# Patient Record
Sex: MALE | Race: WHITE | Employment: OTHER | URBAN - METROPOLITAN AREA
[De-identification: names, ages, dates, MRNs, and addresses within clinical notes are randomized per-mention and may not be internally consistent; named-entity substitution may affect disease eponyms.]

---

## 2017-02-15 PROCEDURE — 84153 ASSAY OF PSA TOTAL: CPT | Performed by: INTERNAL MEDICINE

## 2017-02-15 PROCEDURE — 36415 COLL VENOUS BLD VENIPUNCTURE: CPT | Performed by: INTERNAL MEDICINE

## 2017-02-15 PROCEDURE — 84154 ASSAY OF PSA FREE: CPT | Performed by: INTERNAL MEDICINE

## 2017-08-02 PROBLEM — E78.5 DYSLIPIDEMIA: Status: ACTIVE | Noted: 2017-08-02

## 2017-08-17 ENCOUNTER — HOSPITAL ENCOUNTER (OUTPATIENT)
Dept: INTERVENTIONAL RADIOLOGY/VASCULAR | Facility: HOSPITAL | Age: 67
Discharge: HOME | End: 2017-08-17
Attending: INTERNAL MEDICINE | Admitting: INTERNAL MEDICINE
Payer: MEDICARE

## 2017-08-17 VITALS
HEIGHT: 72 IN | OXYGEN SATURATION: 98 % | WEIGHT: 196 LBS | HEART RATE: 64 BPM | TEMPERATURE: 98 F | BODY MASS INDEX: 26.55 KG/M2 | RESPIRATION RATE: 16 BRPM | SYSTOLIC BLOOD PRESSURE: 116 MMHG | DIASTOLIC BLOOD PRESSURE: 60 MMHG

## 2017-08-17 DIAGNOSIS — R94.39 ABNORMAL STRESS ECG: ICD-10-CM

## 2017-08-17 DIAGNOSIS — I20.8 ANGINA EFFORT: ICD-10-CM

## 2017-08-17 LAB — ISTAT ACTIVATED CLOTTING TIME: 522 SECONDS (ref 74–137)

## 2017-08-17 PROCEDURE — 85347 COAGULATION TIME ACTIVATED: CPT

## 2017-08-17 PROCEDURE — 4A033BC MEASUREMENT OF ARTERIAL PRESSURE, CORONARY, PERCUTANEOUS APPROACH: ICD-10-PCS | Performed by: INTERNAL MEDICINE

## 2017-08-17 PROCEDURE — 93571 IV DOP VEL&/PRESS C FLO 1ST: CPT

## 2017-08-17 PROCEDURE — B2151ZZ FLUOROSCOPY OF LEFT HEART USING LOW OSMOLAR CONTRAST: ICD-10-PCS | Performed by: INTERNAL MEDICINE

## 2017-08-17 PROCEDURE — 99153 MOD SED SAME PHYS/QHP EA: CPT

## 2017-08-17 PROCEDURE — 99152 MOD SED SAME PHYS/QHP 5/>YRS: CPT

## 2017-08-17 PROCEDURE — 4A023N7 MEASUREMENT OF CARDIAC SAMPLING AND PRESSURE, LEFT HEART, PERCUTANEOUS APPROACH: ICD-10-PCS | Performed by: INTERNAL MEDICINE

## 2017-08-17 PROCEDURE — 93458 L HRT ARTERY/VENTRICLE ANGIO: CPT

## 2017-08-17 PROCEDURE — B2111ZZ FLUOROSCOPY OF MULTIPLE CORONARY ARTERIES USING LOW OSMOLAR CONTRAST: ICD-10-PCS | Performed by: INTERNAL MEDICINE

## 2017-08-17 RX ORDER — ASPIRIN 81 MG/1
324 TABLET, CHEWABLE ORAL ONCE
Status: DISCONTINUED | OUTPATIENT
Start: 2017-08-17 | End: 2017-08-17

## 2017-08-17 RX ORDER — SODIUM CHLORIDE 9 MG/ML
INJECTION, SOLUTION INTRAVENOUS CONTINUOUS
Status: DISCONTINUED | OUTPATIENT
Start: 2017-08-17 | End: 2017-08-17

## 2017-08-17 RX ORDER — HEPARIN SODIUM 5000 [USP'U]/ML
INJECTION, SOLUTION INTRAVENOUS; SUBCUTANEOUS
Status: DISCONTINUED
Start: 2017-08-17 | End: 2017-08-17 | Stop reason: WASHOUT

## 2017-08-17 RX ORDER — LIDOCAINE HYDROCHLORIDE 10 MG/ML
INJECTION, SOLUTION INFILTRATION; PERINEURAL
Status: COMPLETED
Start: 2017-08-17 | End: 2017-08-17

## 2017-08-17 RX ORDER — MIDAZOLAM HYDROCHLORIDE 1 MG/ML
INJECTION INTRAMUSCULAR; INTRAVENOUS
Status: COMPLETED
Start: 2017-08-17 | End: 2017-08-17

## 2017-08-17 RX ORDER — ADENOSINE 3 MG/ML
INJECTION, SOLUTION INTRAVENOUS
Status: COMPLETED
Start: 2017-08-17 | End: 2017-08-17

## 2017-08-17 RX ORDER — HEPARIN SODIUM 5000 [USP'U]/ML
INJECTION, SOLUTION INTRAVENOUS; SUBCUTANEOUS
Status: COMPLETED
Start: 2017-08-17 | End: 2017-08-17

## 2017-08-17 RX ORDER — VERAPAMIL HYDROCHLORIDE 2.5 MG/ML
INJECTION, SOLUTION INTRAVENOUS
Status: COMPLETED
Start: 2017-08-17 | End: 2017-08-17

## 2017-08-17 RX ADMIN — SODIUM CHLORIDE: 9 INJECTION, SOLUTION INTRAVENOUS at 07:30:00

## 2017-08-17 NOTE — PROCEDURES
[facility]      Procedure:  LHC, CORONARY ANGIO, LV ANGIO, FFR- LAD                         Findings    LVEF: 60-65%, no mitral regurgitation. LVEDP 7mmHg; no aortic stenosis.     LM: angiographically normal    LCx: Angiographically normal     LAD: 5

## 2017-08-17 NOTE — PROGRESS NOTES
Rc'd pt from cath lab in stable condition. VSS. TR band to right wrist is intact. Site soft, clean and dry, no bleeding or hematoma. Pt denies c/o pain, minimal numbness. Color normal, strong radial pulse and good pleth.  After 1hr, air was removed from ban

## 2017-08-17 NOTE — PROCEDURES
[facility]    PATIENT'S NAME: BOB GREGORY   ATTENDING PHYSICIAN: Karthik Novoa M.D.    OPERATING PHYSICIAN: Dominik Costello MD   PATIENT ACCOUNT#:   [id number]    LOCATION:  [facility] 2 EDWP 10  MEDICAL RECORD #:   [id number]       DATE OF BIRTH:  [birth year] the conclusion of the study, the right radial artery hemostasis was achieved using a radial hemostasis band. LEFT HEART CATHETERIZATION:  The left ventricular end-diastolic pressure was 7 mmHg. There was no aortic stenosis.   Left ventriculogram demon artery and positioned distal to the mid LAD lesion. The baseline FFR ratio was 0.95; after 2 minutes of systemic adenosine (140 mcg/kg/min), repeat FFR ratio was 0.85 with no significant pressure drift upon pullback.   Post FFR angiography demonstrated no

## 2017-08-18 NOTE — PROGRESS NOTES
9/8/2017   3:00 PM    MD KASIE Rankin  3/5/2018   2:00 PM    Neil Granados MD    [facility]    [place] [facility]  3/21/2018  8:40 AM    Doe Flight             [address]     [address]  3/21/2018  9:40 AM    [place]

## 2017-09-12 NOTE — H&P
Reviewed history and physical dated 8/4/17, and note no change in pt history, physical exam, assessment and plan. VS reviewed and stable.   Gen: no apparent distress  Cv: normal rate and regular rhythm, nl s1s2, no mrg  Pulm: ctab, no w/c  Abd: soft, n

## 2017-12-13 PROCEDURE — 84630 ASSAY OF ZINC: CPT | Performed by: OTOLARYNGOLOGY

## 2017-12-13 PROCEDURE — 36415 COLL VENOUS BLD VENIPUNCTURE: CPT | Performed by: OTOLARYNGOLOGY

## 2018-02-13 PROBLEM — K14.0 GLOSSITIS: Status: ACTIVE | Noted: 2018-02-13

## 2018-03-09 PROCEDURE — 84630 ASSAY OF ZINC: CPT | Performed by: PHYSICIAN ASSISTANT

## 2018-03-09 PROCEDURE — 84403 ASSAY OF TOTAL TESTOSTERONE: CPT | Performed by: UROLOGY

## 2018-03-21 PROBLEM — J45.20 MILD INTERMITTENT ASTHMA WITHOUT COMPLICATION: Status: ACTIVE | Noted: 2018-03-21

## 2018-04-13 PROCEDURE — 86235 NUCLEAR ANTIGEN ANTIBODY: CPT | Performed by: OTOLARYNGOLOGY

## 2018-06-27 PROBLEM — E55.9 VITAMIN D DEFICIENCY: Status: ACTIVE | Noted: 2018-06-27

## 2018-06-27 PROBLEM — G47.00 INSOMNIA, UNSPECIFIED TYPE: Status: ACTIVE | Noted: 2018-06-27

## 2018-06-27 PROBLEM — E78.5 DYSLIPIDEMIA: Status: RESOLVED | Noted: 2017-08-02 | Resolved: 2018-06-27

## 2018-06-28 PROCEDURE — 84630 ASSAY OF ZINC: CPT | Performed by: OTOLARYNGOLOGY

## 2018-06-28 PROCEDURE — 82607 VITAMIN B-12: CPT | Performed by: INTERNAL MEDICINE

## 2019-03-15 PROCEDURE — 86255 FLUORESCENT ANTIBODY SCREEN: CPT | Performed by: INTERNAL MEDICINE

## 2019-03-15 PROCEDURE — 86200 CCP ANTIBODY: CPT | Performed by: INTERNAL MEDICINE

## 2019-07-05 PROBLEM — K14.0 GLOSSITIS: Status: RESOLVED | Noted: 2018-02-13 | Resolved: 2019-07-05

## 2019-07-23 PROBLEM — R20.0 NUMBNESS AND TINGLING IN RIGHT HAND: Status: ACTIVE | Noted: 2019-07-23

## 2019-07-23 PROBLEM — R20.2 NUMBNESS AND TINGLING IN RIGHT HAND: Status: ACTIVE | Noted: 2019-07-23

## 2019-07-23 PROCEDURE — 86256 FLUORESCENT ANTIBODY TITER: CPT | Performed by: INTERNAL MEDICINE

## 2019-07-23 PROCEDURE — 82784 ASSAY IGA/IGD/IGG/IGM EACH: CPT | Performed by: INTERNAL MEDICINE

## 2019-08-13 PROBLEM — M51.36 LUMBAR DEGENERATIVE DISC DISEASE: Status: ACTIVE | Noted: 2019-08-13

## 2019-08-13 PROBLEM — M17.12 PRIMARY OSTEOARTHRITIS OF LEFT KNEE: Status: ACTIVE | Noted: 2019-08-13

## 2019-09-03 PROCEDURE — 88305 TISSUE EXAM BY PATHOLOGIST: CPT | Performed by: INTERNAL MEDICINE

## 2020-07-22 PROBLEM — M17.11 PRIMARY OSTEOARTHRITIS OF RIGHT KNEE: Status: ACTIVE | Noted: 2020-07-22

## 2021-05-12 PROBLEM — R20.2 NUMBNESS AND TINGLING IN RIGHT HAND: Status: RESOLVED | Noted: 2019-07-23 | Resolved: 2021-05-12

## 2021-05-12 PROBLEM — R20.0 NUMBNESS AND TINGLING IN RIGHT HAND: Status: RESOLVED | Noted: 2019-07-23 | Resolved: 2021-05-12

## 2021-10-13 ENCOUNTER — LAB ENCOUNTER (OUTPATIENT)
Dept: LAB | Age: 71
End: 2021-10-13
Attending: INTERNAL MEDICINE
Payer: MEDICARE

## 2021-10-13 DIAGNOSIS — M17.0 PRIMARY OSTEOARTHRITIS OF BOTH KNEES: ICD-10-CM

## 2021-10-13 PROCEDURE — 87081 CULTURE SCREEN ONLY: CPT

## 2021-10-18 PROBLEM — Z47.89 ORTHOPEDIC AFTERCARE: Status: ACTIVE | Noted: 2021-10-18

## 2021-11-03 PROBLEM — Z96.652 S/P TOTAL KNEE REPLACEMENT, LEFT: Status: ACTIVE | Noted: 2021-11-03

## 2021-11-09 PROBLEM — M25.60 DECREASED RANGE OF MOTION: Status: ACTIVE | Noted: 2021-11-09

## 2022-01-03 PROBLEM — H93.13 BILATERAL TINNITUS: Status: ACTIVE | Noted: 2022-01-03

## 2022-01-03 PROBLEM — H90.3 SENSORY HEARING LOSS, BILATERAL: Status: ACTIVE | Noted: 2022-01-03

## 2023-10-31 ENCOUNTER — LAB REQUISITION (OUTPATIENT)
Age: 73
End: 2023-10-31
Payer: MEDICARE

## 2023-10-31 DIAGNOSIS — Z86.010 HISTORY OF COLON POLYPS: ICD-10-CM

## 2023-10-31 PROCEDURE — 88305 TISSUE EXAM BY PATHOLOGIST: CPT | Performed by: INTERNAL MEDICINE

## (undated) NOTE — LETTER
[facility] [address], [address]  Consent for Procedure/Sedation    Date:     Time:       1.  I authorize the performance upon Isidro Pan the following:cardiac catheterization, left ventricular cineangiography, bilateral select period, the physician will determine when the applicable recovery period ends for purposes of reinstating the Do Not Resuscitate (DNR) order.     Signature of Patient: ____________________________________________________    Signature of person authorized